# Patient Record
Sex: FEMALE | Race: WHITE | NOT HISPANIC OR LATINO | Employment: FULL TIME | URBAN - METROPOLITAN AREA
[De-identification: names, ages, dates, MRNs, and addresses within clinical notes are randomized per-mention and may not be internally consistent; named-entity substitution may affect disease eponyms.]

---

## 2022-09-16 ENCOUNTER — HOSPITAL ENCOUNTER (EMERGENCY)
Facility: HOSPITAL | Age: 37
Discharge: HOME | End: 2022-09-16
Attending: EMERGENCY MEDICINE

## 2022-09-16 VITALS
HEIGHT: 66 IN | OXYGEN SATURATION: 98 % | HEART RATE: 82 BPM | DIASTOLIC BLOOD PRESSURE: 89 MMHG | SYSTOLIC BLOOD PRESSURE: 140 MMHG | BODY MASS INDEX: 39.37 KG/M2 | RESPIRATION RATE: 18 BRPM | TEMPERATURE: 99 F | WEIGHT: 245 LBS
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
## 2022-09-16 DIAGNOSIS — T63.301A SPIDER BITE WOUND, ACCIDENTAL OR UNINTENTIONAL, INITIAL ENCOUNTER: ICD-10-CM

## 2022-09-16 DIAGNOSIS — L03.311 CELLULITIS OF ABDOMINAL WALL: Primary | ICD-10-CM

## 2022-09-16 PROCEDURE — 99283 EMERGENCY DEPT VISIT LOW MDM: CPT

## 2022-09-16 PROCEDURE — 99284 EMERGENCY DEPT VISIT MOD MDM: CPT | Mod: ,,, | Performed by: EMERGENCY MEDICINE

## 2022-09-16 PROCEDURE — 99284 PR EMERGENCY DEPT VISIT,LEVEL IV: ICD-10-PCS | Mod: ,,, | Performed by: EMERGENCY MEDICINE

## 2022-09-16 RX ORDER — SULFAMETHOXAZOLE AND TRIMETHOPRIM 800; 160 MG/1; MG/1
1 TABLET ORAL 2 TIMES DAILY
Qty: 14 TABLET | Refills: 0 | Status: SHIPPED | OUTPATIENT
Start: 2022-09-16 | End: 2022-09-23

## 2022-09-17 NOTE — DISCHARGE INSTRUCTIONS
Today, your evaluation for spider bite shows evidence of cellulitis around your bite.  Will place you on antibiotics for 1 week.  You may use warm compresses, Tylenol or ibuprofen for pain.  Avoid scratching or preventing any further infection.  Follow-up with a primary care in 1 week if her symptoms do not improve.    Our goal in the emergency department is to always give you outstanding care and exceptional service. You may receive a survey by mail or e-mail in the next week regarding your experience in our ED. We would greatly appreciate your completing and returning the survey. Your feedback provides us with a way to recognize our staff who give very good care and it helps us learn how to improve when your experience was below our aspiration of excellence.

## 2022-09-17 NOTE — ED TRIAGE NOTES
Pt states that she thinks she was bit by a spider about ten days ago. Pt has wound on RLQ of abdomen that is red inflamed and and WTT. Pt states that wound burst yesterday and a green liquid came out of wound. Pt states site is a little tender but not too painful. Pt denies fever or chills. Pt states she has had mild diarrhea and is a little nauseous.

## 2022-09-17 NOTE — ED NOTES
Patient identifiers for Gale Coronado checked and correct.  LOC: Patient is awake, alert, and aware of environment with an appropriate affect. Patient is oriented x 3 and speaking appropriately.  APPEARANCE: Patient resting comfortably and in no acute distress. Patient is clean and well groomed, patient's clothing is properly fastened.  SKIN: The skin is warm and dry. Patient has normal skin turgor and moist mucus membrances. Skin is intact; no bruising or breakdown noted.  MUSKULOSKELETAL: Patient is moving all extremities well, no obvious deformities noted. Pulses intact.   RESPIRATORY: Airway is open and patent. Respirations are spontaneous and non-labored with normal effort and rate.  CARDIAC: Patient has a normal rate and rhythm. No peripheral edema noted. Capillary refill < 3 seconds.  ABDOMEN: No distention noted. Bowel sounds active in all 4 quadrants. Soft and non-tender upon palpation.  NEUROLOGICAL: PERRL. Facial expression is symmetrical. Hand grasps are equal bilaterally. Normal sensation in all extremities when touched with finger.  Allergies reported:   Review of patient's allergies indicates:   Allergen Reactions    Macrobid [nitrofurantoin monohyd/m-cryst]

## 2022-09-17 NOTE — ED PROVIDER NOTES
Encounter Date: 9/16/2022    SCRIBE #1 NOTE: I, Hanna Miller, am scribing for, and in the presence of,  Dionisio Medina DO. I have scribed the following portions of the note - Other sections scribed: HPI, ROS.     History     Chief Complaint   Patient presents with    Insect Bite     Pt c/o spider bite to right hip x 1 week. Pt reports redness and swelling to area.      Gale Coronado is a 37 y.o. female with no significant prior medical history presenting with spider bite to right abdomen with onset a week ago. The area is red and swollen. She also notes that she has been nauseous for the past few days. She denies fever, chills, and vomiting.  She denies any severe abdominal pain, back pain, arm pain or neurologic pain.  She denies any lightheadedness or dizziness at this time.  Her nausea has resolved.    The history is provided by the patient and medical records. No  was used.   Review of patient's allergies indicates:   Allergen Reactions    Macrobid [nitrofurantoin monohyd/m-cryst]      History reviewed. No pertinent past medical history.  Past Surgical History:   Procedure Laterality Date    ECTOPIC PREGNANCY SURGERY      SKIN BIOPSY      TONSILLECTOMY       History reviewed. No pertinent family history.  Social History     Tobacco Use    Smoking status: Former     Types: Cigarettes    Smokeless tobacco: Never   Substance Use Topics    Alcohol use: Yes     Comment: occasionally    Drug use: Yes     Types: Marijuana     Comment: alan     Review of Systems   Constitutional:  Negative for diaphoresis, fatigue and fever.   HENT:  Negative for congestion and sore throat.    Eyes:  Negative for photophobia, pain and visual disturbance.   Respiratory:  Negative for chest tightness, shortness of breath and wheezing.    Cardiovascular:  Negative for chest pain and palpitations.   Gastrointestinal:  Negative for abdominal pain, nausea and vomiting.   Genitourinary:  Negative for dysuria,  frequency and hematuria.   Musculoskeletal:  Negative for neck pain and neck stiffness.   Skin:  Positive for wound (right abdomen). Negative for color change.   Neurological:  Negative for tremors and light-headedness.   Psychiatric/Behavioral:  Negative for confusion.      Physical Exam     Initial Vitals [09/16/22 1938]   BP Pulse Resp Temp SpO2   (!) 140/89 82 18 98.7 °F (37.1 °C) 98 %      MAP       --         Physical Exam    Nursing note and vitals reviewed.    Gen/Constitutional: Interactive. No acute distress  Head: Normocephalic, Atraumatic  Neck: supple, no masses or LAD, no JVD  Eyes: PERRLA, conjunctiva clear  Ears, Nose and Throat: No rhinorrhea or stridor.  Cardiac:  Regular rate, Reg Rhythm, No murmur  Pulmonary: CTA Bilat, no wheezes, rhonchi, rales.  No increased work of breathing.  GI: Abdomen soft, non-tender, non-distended; no rebound or guarding  : No CVA tenderness.  Musculoskeletal: Extremities warm, well perfused, no erythema, no edema  Skin: No rashes, cyanosis or jaundice.  Right abdomen with raised area from insect bite with surrounding cellulitis approximately 2 x 3 cm.  There is no fluctuance or discharge.  Neuro: Alert and Oriented x 3; No focal motor or sensory deficits.    Psych: Normal affect     ED Course   Procedures  Labs Reviewed - No data to display         Imaging Results    None          Medications - No data to display  Medical Decision Making:   History:   Old Medical Records: I decided to obtain old medical records.  Initial Assessment:   Gale Coronado is a 37 y.o. female with no significant prior medical history presenting with spider bite to right abdomen with onset a week ago.  Differential Diagnosis:   Insect bite, spider bite, localized wound necrosis, cellulitis, abscess     Afebrile vital signs are stable.  Physical exam findings remarkable for surrounding cellulitis, erythema and edema to an insect bite which occurred 1 week ago.  Symptoms have not improved but  without any systemic signs of fevers, chills, or abdominal pain.  Do not suspect deep wound infection or abscess based on exam, history and findings.  Given worsening cellulitis for 1 week, will prescribe Bactrim with close follow-up.  Patient given strict ED precautions return instructions including instructions on warm compress and outpatient follow-up.  Do not suspect brown recluse or black  spider bite based on a absence of necrotizing wound infection and absence of neurotoxin symptoms. Patient agreeable to discharge plan. Strict ED precautions and return instructions discussed at length and patient verbalized understanding. All questions were answered and ample time was given for questions.      Complexity:  Moderate     Scribe Attestation:   Scribe #1: I performed the above scribed service and the documentation accurately describes the services I performed. I attest to the accuracy of the note.            I, Dr. Dionisio Medina, personally performed the services described in this documentation. All medical record entries made by the scribe were at my direction and in my presence.  I have reviewed the chart and agree that the record reflects my personal performance and is accurate and complete.          Clinical Impression:   Final diagnoses:  [L03.311] Cellulitis of abdominal wall (Primary)  [W57.XXXA] Bug bite with infection, initial encounter  [T63.301A] Spider bite wound, accidental or unintentional, initial encounter        ED Disposition Condition    Discharge Stable          ED Prescriptions       Medication Sig Dispense Start Date End Date Auth. Provider    sulfamethoxazole-trimethoprim 800-160mg (BACTRIM DS) 800-160 mg Tab Take 1 tablet by mouth 2 (two) times daily. for 7 days 14 tablet 9/16/2022 9/23/2022 Dionisio Medina, DO          Follow-up Information       Follow up With Specialties Details Why Contact Info    [facility]  Schedule an appointment as soon as possible for a  visit in 1 week As needed, If symptoms worsen [address] [facility] [address]  [phone]            Dionisio Medina DO, FAAEM  Emergency Staff Physician   Dept of Emergency Medicine   [facility]  Spectralink: [phone]        Disclaimer: This note has been generated using voice-recognition software. There may be typographical errors that have been missed during proof-reading.      Dionisio Medina DO  09/17/22 [id number]

## 2022-09-19 ENCOUNTER — HOSPITAL ENCOUNTER (EMERGENCY)
Facility: HOSPITAL | Age: 37
Discharge: HOME | End: 2022-09-20
Attending: EMERGENCY MEDICINE

## 2022-09-19 VITALS
RESPIRATION RATE: 18 BRPM | OXYGEN SATURATION: 99 % | TEMPERATURE: 98 F | BODY MASS INDEX: 37.67 KG/M2 | DIASTOLIC BLOOD PRESSURE: 78 MMHG | SYSTOLIC BLOOD PRESSURE: 148 MMHG | WEIGHT: 240 LBS | HEART RATE: 75 BPM | HEIGHT: 67 IN

## 2022-09-19 DIAGNOSIS — M79.662 PAIN IN LEFT LOWER LEG: ICD-10-CM

## 2022-09-19 DIAGNOSIS — M79.605 LEFT LEG PAIN: ICD-10-CM

## 2022-09-19 PROCEDURE — 99284 PR EMERGENCY DEPT VISIT,LEVEL IV: ICD-10-PCS | Mod: ,,, | Performed by: NURSE PRACTITIONER

## 2022-09-19 PROCEDURE — 99284 EMERGENCY DEPT VISIT MOD MDM: CPT | Mod: ,,, | Performed by: NURSE PRACTITIONER

## 2022-09-19 PROCEDURE — 99284 EMERGENCY DEPT VISIT MOD MDM: CPT | Mod: 25

## 2022-09-19 NOTE — ED PROVIDER NOTES
Chief complaint:  Leg Pain (Pt c/o left lower leg pain.  Hx DVT  Onset yesterday. )      HPI:  Gale Coronado is a 37 y.o. female with a past medical history significant for PE and DVT who presents to the emergency department today for evaluation of pain behind her left knee.  The pain began 2 days ago.  She denies any trauma.  She denies limited range of motion, weakness or numbness.  She denies swelling.  Denies chest pain or shortness of breath.  Denies fever or chills.  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  She states that these were determined to be related to her oral contraceptives which were discontinued.  She was anticoagulated for 1 year following these events but does not currently anticoagulated.  She does not currently use any hormones.  She is not a smoker.  She denies being sedentary recently.  The pain is located behind the left knee.  It is constant and aggravated by No other problems or concerns voiced at this time.    Review of patient's allergies indicates:   Allergen Reactions    Macrobid [nitrofurantoin monohyd/m-cryst]        No current facility-administered medications on file prior to encounter.     Current Outpatient Medications on File Prior to Encounter   Medication Sig Dispense Refill    sulfamethoxazole-trimethoprim 800-160mg (BACTRIM DS) 800-160 mg Tab Take 1 tablet by mouth 2 (two) times daily. for 7 days 14 tablet 0       PMH:  As per HPI and below:  No past medical history on file.  Past Surgical History:   Procedure Laterality Date    ECTOPIC PREGNANCY SURGERY      SKIN BIOPSY      TONSILLECTOMY         Social History     Socioeconomic History    Marital status: Single   Tobacco Use    Smoking status: Former     Types: Cigarettes    Smokeless tobacco: Never   Substance and Sexual Activity    Alcohol use: Yes     Comment: occasionally    Drug use: Yes     Types: Marijuana     Comment: alan       No family history on file.    Review of Systems  As per HPI and  Below  Constitutional: Negative for chills and fever.   HENT: Negative for congestion and sinus pressure.    Eyes: Negative for visual disturbance.   Respiratory: Negative for cough, chest tightness and shortness of breath.    Cardiovascular: Negative for chest pain.   Gastrointestinal: Negative for abdominal pain, diarrhea, nausea and vomiting.   Genitourinary: Negative for flank pain. Negative for dysuria.  Musculoskeletal:  Positive for leg pain.  Negative for swelling.  Skin: Negative for rash and wound.   Neurological: Negative for dizziness. Negative for weakness and numbness.   Psychiatric/Behavioral: Negative for confusion.       Physical Exam:    Vitals:    09/19/22 1954   BP: (!) 148/78   Pulse:    Resp: 18   Temp: 98.4 °F (36.9 °C)     Nursing note and vitals reviewed.  Constitutional: Patient appears well-developed and well-nourished. Not diaphoretic. No distress.   HENT:   Head: Normocephalic and atraumatic.   Eyes: Conjunctivae are normal. No scleral icterus.   Neck: Normal range of motion. Neck supple.   Cardiovascular: Normal rate, regular rhythm and normal heart sounds.   Pulmonary/Chest: Breath sounds normal. No respiratory distress.  Abdominal: Soft. There is no tenderness.   Musculoskeletal:  Patient has very slight tenderness to palpation of the left popliteal region.  There is no swelling, erythema or warmth.  There is otherwise no tenderness to the left leg.  Normal range of motion.  Normal pedal pulse.  Negative Homans sign.  Neurological: Alert and oriented to person, place, and time. Normal strength. No sensory deficit.   Skin: Skin is warm and dry. No rash noted. No erythema. No pallor.   Psychiatric: Normal mood and affect. Thought content normal.       Labs Reviewed   HIV 1 / 2 ANTIBODY   HEPATITIS C ANTIBODY       Imaging Results              US Lower Extremity Veins Left (Final result)  Result time 09/19/22 19:34:36      Final result by James Nieto MD (09/19/22 19:34:36)                    Impression:      No evidence of deep venous thrombosis in the left lower extremity.    Electronically signed by resident: Natan Odom  Date:    09/19/2022  Time:    19:31    Electronically signed by: James Nieto MD  Date:    09/19/2022  Time:    19:34               Narrative:    EXAMINATION:  US LOWER EXTREMITY VEINS LEFT    CLINICAL HISTORY:  Pain in left lower leg    TECHNIQUE:  Duplex and color flow Doppler evaluation and graded compression of the left lower extremity veins was performed.    COMPARISON:  None    FINDINGS:  Left thigh veins: The common femoral, femoral, popliteal, and upper greater saphenous veins are patent and free of thrombus. The veins are normally compressible and have normal phasic flow and augmentation response.    Left calf veins: The visualized calf veins are patent.    Contralateral CFV: The contralateral (right) common femoral vein is patent and free of thrombus.    Miscellaneous: None                                      No results found.    Procedures      MDM:    37 y.o. female presenting for evaluation of a 2 day history of pain behind the left knee.  No trauma.  History of DVT and PE in 2014.  She is not anticoagulated.  She is afebrile, nontoxic and nondistressed.  She has no other symptoms.  The pain is isolated to the left popliteal region.  She otherwise has no pain nor swelling.  The pain is aggravated with movement.    Differential diagnoses include:  DVT, Baker cyst, muscle strain, infectious process, septic arthritis     DVT ultrasound is negative.  There is no erythema or warmth to the area.  She is afebrile and nontoxic appearing.  No rash or wounds.  She has normal joint range of motion.  I do not suspect infectious process.    I discussed these results with the patient.  She is stable for discharge home with outpatient follow-up.  Strict ED return precautions discussed.  She may take over the counter analgesia per package instructions if needed.             Medication List        ASK your doctor about these medications      sulfamethoxazole-trimethoprim 800-160mg 800-160 mg Tab  Commonly known as: BACTRIM DS  Take 1 tablet by mouth 2 (two) times daily. for 7 days               Diagnoses:  Diagnoses of Pain in left lower leg and Left leg pain were pertinent to this visit.         Shania Jay NP  09/19/22 [id number]

## 2022-09-19 NOTE — FIRST PROVIDER EVALUATION
Emergency Department TeleTriage Encounter Note      CHIEF COMPLAINT    Chief Complaint   Patient presents with    Leg Pain     Pt c/o left lower leg pain.  Hx DVT  Onset yesterday.        VITAL SIGNS   Initial Vitals [09/19/22 1736]   BP Pulse Resp Temp SpO2   (!) 148/110 75 16 98.4 °F (36.9 °C) 99 %      MAP       --            ALLERGIES    Review of patient's allergies indicates:   Allergen Reactions    Macrobid [nitrofurantoin monohyd/m-cryst]        PROVIDER TRIAGE NOTE  Patient presents with complaint of left lower leg pain x 2 days.  Her pain is worse with walking.  She has history of blood clots in this leg previously in 2014.  She is not currently on a blood thinner.  No chest pain or SOB.    Will order U/S left lower leg for evaluation of possible DVT.  Initial orders will be placed and care will be transferred to an alternate provider when patient is roomed for a full evaluation. Any additional orders and the final disposition will be determined by that provider.         ORDERS  Labs Reviewed   HIV 1 / 2 ANTIBODY   HEPATITIS C ANTIBODY       ED Orders (720h ago, onward)      Start Ordered     Status Ordering Provider    09/19/22 1738 09/19/22 1737  HIV 1/2 Ag/Ab (4th Gen)  STAT         Ordered JUJU LAGUERRE    09/19/22 1738 09/19/22 1737  Hepatitis C Antibody  STAT         Ordered JUJU LAUGERRE              Virtual Visit Note: The provider triage portion of this emergency department evaluation and documentation was performed via [employer], a HIPAA-compliant telemedicine application, in concert with a tele-presenter in the room. A face to face patient evaluation with one of my colleagues will occur once the patient is placed in an emergency department room.      DISCLAIMER: This note was prepared with [employer] voice recognition transcription software. Garbled syntax, mangled pronouns, and other bizarre constructions may be attributed to that software system.

## 2022-09-19 NOTE — ED TRIAGE NOTES
The pt is a 37-year-old female who presents to the ED from home via personal transportation. The pt reports pain behind her left knee for the last two days that is stabbing in nature and reportedly exacerbated by walking. The pain intensity is reported to be 6/10. The pt states she has a hx of blood clots and presents to the ED today to rule out any blood clots.

## 2022-09-19 NOTE — ED NOTES
Patient identifiers for Gale Coronado 37 y.o. female checked and correct.  Chief Complaint   Patient presents with    Leg Pain     Pt c/o left lower leg pain.  Hx DVT  Onset yesterday.      No past medical history on file.  Allergies reported:   Review of patient's allergies indicates:   Allergen Reactions    Macrobid [nitrofurantoin monohyd/m-cryst]          LOC: Patient is awake, alert, and aware of environment with an appropriate affect. Patient is oriented x 4 and speaking appropriately.  APPEARANCE: Patient resting comfortably and in no acute distress. Patient is clean and well groomed, patient's clothing is properly fastened.  HEENT: Pt presents with surgical mask on.   SKIN: The skin is warm and dry. Patient has normal skin turgor and moist mucus membranes.   MUSKULOSKELETAL: Patient is moving all extremities well, no obvious deformities noted. Pulses intact. The pt reports pain behind her left knee.   RESPIRATORY: Airway is open and patent. Respirations are spontaneous and non-labored with normal effort and rate.  CARDIAC: Patient has a normal rate and rhythm. 75 on cardiac monitor. No peripheral edema noted.   ABDOMEN: No distention noted. Soft and non-tender upon palpation.  NEUROLOGICAL: Facial expression is symmetrical. Hand grasps are equal bilaterally. Normal sensation in all extremities when touched with finger.